# Patient Record
Sex: MALE | Race: OTHER | HISPANIC OR LATINO | Employment: FULL TIME | URBAN - METROPOLITAN AREA
[De-identification: names, ages, dates, MRNs, and addresses within clinical notes are randomized per-mention and may not be internally consistent; named-entity substitution may affect disease eponyms.]

---

## 2022-11-03 ENCOUNTER — HOSPITAL ENCOUNTER (EMERGENCY)
Facility: HOSPITAL | Age: 62
Discharge: HOME | End: 2022-11-03
Attending: STUDENT IN AN ORGANIZED HEALTH CARE EDUCATION/TRAINING PROGRAM

## 2022-11-03 VITALS
BODY MASS INDEX: 27.66 KG/M2 | TEMPERATURE: 99 F | RESPIRATION RATE: 18 BRPM | OXYGEN SATURATION: 100 % | WEIGHT: 162 LBS | DIASTOLIC BLOOD PRESSURE: 81 MMHG | SYSTOLIC BLOOD PRESSURE: 144 MMHG | HEIGHT: 64 IN | HEART RATE: 65 BPM

## 2022-11-03 DIAGNOSIS — S61.217A LACERATION OF LEFT LITTLE FINGER WITHOUT FOREIGN BODY WITHOUT DAMAGE TO NAIL, INITIAL ENCOUNTER: Primary | ICD-10-CM

## 2022-11-03 DIAGNOSIS — S62.637A DISPLACED FRACTURE OF DISTAL PHALANX OF LEFT LITTLE FINGER, INITIAL ENCOUNTER FOR CLOSED FRACTURE: ICD-10-CM

## 2022-11-03 PROCEDURE — 25000003 PHARM REV CODE 250: Performed by: STUDENT IN AN ORGANIZED HEALTH CARE EDUCATION/TRAINING PROGRAM

## 2022-11-03 PROCEDURE — 99283 EMERGENCY DEPT VISIT LOW MDM: CPT | Mod: 25

## 2022-11-03 PROCEDURE — 26750 PR CLOSE RX DIST FINGR FX: ICD-10-PCS | Mod: LT,,, | Performed by: STUDENT IN AN ORGANIZED HEALTH CARE EDUCATION/TRAINING PROGRAM

## 2022-11-03 PROCEDURE — 26750 TREAT FINGER FRACTURE EACH: CPT | Mod: LT,,, | Performed by: STUDENT IN AN ORGANIZED HEALTH CARE EDUCATION/TRAINING PROGRAM

## 2022-11-03 PROCEDURE — 99283 EMERGENCY DEPT VISIT LOW MDM: CPT | Mod: 57,,, | Performed by: STUDENT IN AN ORGANIZED HEALTH CARE EDUCATION/TRAINING PROGRAM

## 2022-11-03 PROCEDURE — 99283 PR EMERGENCY DEPT VISIT,LEVEL III: ICD-10-PCS | Mod: 57,,, | Performed by: STUDENT IN AN ORGANIZED HEALTH CARE EDUCATION/TRAINING PROGRAM

## 2022-11-03 PROCEDURE — 12001 RPR S/N/AX/GEN/TRNK 2.5CM/<: CPT

## 2022-11-03 RX ORDER — LIDOCAINE HYDROCHLORIDE 10 MG/ML
5 INJECTION INFILTRATION; PERINEURAL
Status: COMPLETED | OUTPATIENT
Start: 2022-11-03 | End: 2022-11-03

## 2022-11-03 RX ADMIN — LIDOCAINE HYDROCHLORIDE 5 ML: 10 INJECTION, SOLUTION INFILTRATION; PERINEURAL at 10:11

## 2022-11-03 NOTE — CONSULTS
"Orthopedic Surgery Consult Note    Reason for Consult:  Left small finger injury    HPI:  Patient is a 62-year-old male who is sent here from [place] after injuring his left small finger yesterday.  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  Complains of pain to left small finger localized to the distal phalanx.  Does not radiate.  It is sharp pain.  He does not have any numbness or tingling into the fingertips.  He has no other injuries.      PMH: No past medical history on file.    PSH: No past surgical history on file.    Med:   No current facility-administered medications on file prior to encounter.     No current outpatient medications on file prior to encounter.       Allergies: Review of patient's allergies indicates:  No Known Allergies    SH:   Social History     Socioeconomic History    Marital status:        FH: None    ROS:   Constitutional: Denies fever chills  Eyes: No change in vision  ENT: No ringing or current infections  CV: No chest pain  Resp: No labored breathing  MSK: Pain evident at site of injury located in HPI,   Integ: No signs of abrasions or lacerations  Neuro: No numbness or tingling  Lymphatic: No swelling outside the area of injury     BP (!) 144/81   Pulse 69   Temp 98.6 °F (37 °C)   Resp 18   Ht 5' 4" (1.626 m)   Wt 73.5 kg (162 lb)   SpO2 100%   BMI 27.81 kg/m²   NAD, Alert, Awake, Ox4   HEENT: atraumatic, normal cephalic, neg ecchymosis, lacerations   CV: No increased work of breathing   Pulm: Normal work of breathing   Skin: No cutaneous rash, no open wounds   Vascular: 2+ RP, wwp, bcr   Left small finger: Traumatic laceration over the volar side of the D IP joint.  FDP tendon is intact.  Two-point discrimination is 5 mm on both the radial and ulnar side of the digit.  The digit is warm well perfused.       Imaging:  X-ray of the left small finger shows nondisplaced fracture of the dorsal base of the distal " phalanx without any volar subluxation of the D IP joint.  It appears that he had a old mallet fracture site this is likely an acute on chronic injury.        Assessment:  Left small finger nondisplaced bony mallet with laceration    Plan:  Left nondisplaced bony mallet fracture of distal phalanx  He has does not have a tendon or nerve injury so I do not think surgical exploration of the wound is necessary.  I think this wound can be irrigated and closed at the bedside.  He can have a Alumafoam splint to keep his D IP extended.  He can be discharged on oral antibiotics.  Since he lives in [place] he is welcome to follow up with a physician in [place] however I would be happy to see him in my clinic in 2 weeks if he cannot get an appointment [place].

## 2022-11-03 NOTE — ED TRIAGE NOTES
"Pt instructed to go to ED from [facility] in [place] for continued care of injury to left 5th finger as "mashed in a door" 11/2/22 at 1630  "

## 2022-11-03 NOTE — ED PROVIDER NOTES
"Encounter Date: 11/3/2022       History     Chief Complaint   Patient presents with    Finger Injury     Pt instructed to go to ED from [facility] in [place] for continued care of injury to left 5th finger 11/2/11 at 1630 as "mashed in a door"     HPI    62-year-old  male with a past medical history of hypertension presents emergency department as a transfer from [facility] for orthopedic consultation for a finger fracture.  Patient states that yesterday on 11/02 at 4:30 p.m. he smashed his finger between 2 doors.  States he originally went to a urgent care which she got a tetanus shot and a shot of antibiotics.  There was sent to the ER.  ER then contacted the transfer center for orthopedic evaluation because of patient's inability to extend his left 5th digit.  They were concerned of tendon disruption.  He also sustained a fracture of the finger as well.  Patient states the pain is currently under control.    Review of patient's allergies indicates:  No Known Allergies  No past medical history on file.  No past surgical history on file.  No family history on file.     Review of Systems   Constitutional:  Negative for fever.   Respiratory:  Negative for cough.    Cardiovascular:  Negative for chest pain.   Gastrointestinal:  Negative for abdominal pain.   Musculoskeletal:         Per HPI   All other systems reviewed and are negative.    Physical Exam     Initial Vitals [11/03/22 0902]   BP Pulse Resp Temp SpO2   (!) 144/81 69 18 98.6 °F (37 °C) 100 %      MAP       --         Physical Exam    Nursing note and vitals reviewed.  Constitutional: He appears well-developed and well-nourished. No distress.   Cardiovascular:  Normal rate and regular rhythm.           Pulmonary/Chest: Breath sounds normal. No respiratory distress.   Abdominal: Abdomen is soft. Bowel sounds are normal.   Musculoskeletal:         General: Tenderness (tenderness to palpation left 5th digit of the hand.  A " laceration noted.  Patient unable to extend the finger at the distal aspect) present. Normal range of motion.     Neurological: He is alert and oriented to person, place, and time.   Skin: Capillary refill takes less than 2 seconds.   Psychiatric: He has a normal mood and affect. Thought content normal.                 ED Course   Lac Repair    Date/Time: 11/3/2022 11:15 AM  Performed by: Flavio Carpio MD  Authorized by: Flavio Carpio MD     Consent:     Consent obtained:  Verbal    Consent given by:  Patient    Risks, benefits, and alternatives were discussed: yes      Risks discussed:  Infection, pain, retained foreign body, need for additional repair, poor cosmetic result, tendon damage, nerve damage, poor wound healing and vascular damage    Alternatives discussed:  No treatment, delayed treatment, referral and observation  Universal protocol:     Procedure explained and questions answered to patient or proxy's satisfaction: yes      Patient identity confirmed:  Verbally with patient  Anesthesia:     Anesthesia method:  Local infiltration    Local anesthetic:  Lidocaine 1% w/o epi  Laceration details:     Location:  Finger    Finger location:  L small finger    Length (cm):  2  Pre-procedure details:     Preparation:  Patient was prepped and draped in usual sterile fashion and imaging obtained to evaluate for foreign bodies  Exploration:     Hemostasis achieved with:  Direct pressure    Imaging obtained: x-ray      Imaging outcome: foreign body not noted      Wound exploration: wound explored through full range of motion and entire depth of wound visualized      Contaminated: no    Treatment:     Area cleansed with:  Soap and water and povidone-iodine    Amount of cleaning:  Extensive    Irrigation solution:  Sterile saline    Irrigation volume:  1L  Skin repair:     Repair method:  Sutures    Suture size:  4-0    Wound skin closure material used: Ethylene.    Number of sutures:  5  Approximation:      Approximation:  Close  Repair type:     Repair type:  Simple  Post-procedure details:     Dressing:  Non-adherent dressing    Procedure completion:  Tolerated well, no immediate complications  Labs Reviewed - No data to display       Imaging Results              X-Ray Finger 2 or More Views Left (Final result)  Result time 11/03/22 10:03:28      Final result by Ruth Cabrera MD (11/03/22 10:03:28)                   Impression:      Suspected nondisplaced fracture of the base of the distal phalanx.      Electronically signed by: Ruth Cabrera  Date:    11/03/2022  Time:    10:03               Narrative:    EXAMINATION:  XR FINGER 2 OR MORE VIEWS LEFT    CLINICAL HISTORY:  left 5th finger injury;    COMPARISON:  None.    FINDINGS:  There is a suspected nondisplaced fracture of the base of the distal phalanx.  The soft tissues are unremarkable.                                       Medications   LIDOcaine HCL 10 mg/ml (1%) injection 5 mL (5 mLs Infiltration Given 11/3/22 1056)     Medical Decision Making:   Differential Diagnosis:   Fracture, tendon laceration, open fracture, crush injury           ED Course as of 11/03/22 1119   Thu Nov 03, 2022   1050 Dr. Omalley, ortho hand surgeon, came down and evaluated the patient.  No indication for surgery.  States to suture up the wound and follow-up with a orthopedic in [place] [BS]      ED Course User Index  [BS] Flavio Carpio MD                 Clinical Impression:   Final diagnoses:  [S61.217A] Laceration of left little finger without foreign body without damage to nail, initial encounter (Primary)  [M68.067M] Displaced fracture of distal phalanx of left little finger, initial encounter for closed fracture      ED Disposition Condition    Discharge Stable          ED Prescriptions    None       Follow-up Information       Follow up With Specialties Details Why Contact Info    [facility] - Emergency Dept Emergency Medicine Go to  If  symptoms worsen [address]  [facility] [id number] [phone]    Sutures may be removed in 7-10 days.  Follow-up with orthopedic surgeon in [place]                 Flavio Carpio MD  11/03/22 6763